# Patient Record
Sex: MALE | Race: OTHER
[De-identification: names, ages, dates, MRNs, and addresses within clinical notes are randomized per-mention and may not be internally consistent; named-entity substitution may affect disease eponyms.]

---

## 2018-04-22 ENCOUNTER — HOSPITAL ENCOUNTER (INPATIENT)
Dept: HOSPITAL 80 - FED | Age: 33
LOS: 3 days | Discharge: HOME | DRG: 880 | End: 2018-04-25
Attending: SPECIALIST | Admitting: SPECIALIST
Payer: COMMERCIAL

## 2018-04-22 DIAGNOSIS — F11.23: ICD-10-CM

## 2018-04-22 DIAGNOSIS — M25.571: ICD-10-CM

## 2018-04-22 DIAGNOSIS — F25.9: ICD-10-CM

## 2018-04-22 DIAGNOSIS — Z91.14: ICD-10-CM

## 2018-04-22 DIAGNOSIS — F17.210: ICD-10-CM

## 2018-04-22 DIAGNOSIS — R45.851: Primary | ICD-10-CM

## 2018-04-22 DIAGNOSIS — M54.5: ICD-10-CM

## 2018-04-22 DIAGNOSIS — Z56.0: ICD-10-CM

## 2018-04-22 DIAGNOSIS — Z91.5: ICD-10-CM

## 2018-04-22 DIAGNOSIS — Z81.8: ICD-10-CM

## 2018-04-22 LAB — PLATELET # BLD: 284 10^3/UL (ref 150–400)

## 2018-04-22 PROCEDURE — G0480 DRUG TEST DEF 1-7 CLASSES: HCPCS

## 2018-04-22 RX ADMIN — MORPHINE SULFATE SCH MG: 15 TABLET, FILM COATED, EXTENDED RELEASE ORAL at 19:12

## 2018-04-22 RX ADMIN — HYDROCODONE BITARTRATE AND ACETAMINOPHEN PRN TAB: 5; 325 TABLET ORAL at 19:17

## 2018-04-22 SDOH — ECONOMIC STABILITY - INCOME SECURITY: UNEMPLOYMENT, UNSPECIFIED: Z56.0
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
## 2018-04-22 NOTE — EDPHY
H & P


Time Seen by Provider: 04/22/18 02:41


HPI/ROS: 





Chief Complaint:  Hearing voices, off his medications





HPI:  32-year-old male with a history of schizoaffective disorder has been off 

his medications for"quite a while".  He presented to the crisis Center this 

morning complaining of hearing voices that are telling him to hurt himself.  

Patient was evaluated and placed on M1 hold.  He was sent here for medical 

clearance.  They are planning on looking for placement for him.  Denies any self

-injury or ingestions.  Denies alcohol or other drug use.  No nausea or 

vomiting.  No chest pain or shortness of breath.  No fevers or chills or recent 

illness.





ROS:  10 point Review of Systems is negative except as noted in the HPI.





PMH:  Schizoaffective disorder





Social History:  Denies smoking, denies alcohol





Family History: non-contributory





Physical Exam:


Gen: Awake, Alert, No Distress


HEENT:  


     Nose: no rhinorrhea


     Eyes: PERRLA, EOMI


     Mouth: Moist mucosa 


Neck: Supple, no JVD


Chest: nontender, lungs clear to auscultation


Heart: S1, S2 normal, no murmur


Abd: Soft, non-tender, no guarding


Back: no CVA tenderness, no midline tenderness 


Ext: no edema, non-tender


Skin: no rash


Neuro: CN II-XII intact, Sensation grossly intact, Strength 5/5 in bilateral 

upper and lower extremities


 (Александр Montano)


Constitutional: 


 Initial Vital Signs











Temperature (C)  36.5 C   04/22/18 02:50


 


Heart Rate  78   04/22/18 02:50


 


Respiratory Rate  18   04/22/18 02:50


 


Blood Pressure  152/92 H  04/22/18 02:50


 


O2 Sat (%)  96   04/22/18 02:50








 











O2 Delivery Mode               Room Air














Allergies/Adverse Reactions: 


 





No Known Allergies Allergy (Unverified 04/22/18 08:25)


 








Home Medications: 














 Medication  Instructions  Recorded


 


ALPRAZolam [Xanax 1 MG (*)] 1 mg PO DAILY PRN 04/22/18


 


HYDROcodone BITARTRATE [Hysingla 80 mg PO DAILY 04/22/18





ER]  


 


Hydrocodone/Acetaminophen [Norco 1 each PO BID PRN 04/22/18





7.5-325 Tablet]  


 


QUEtiapine FUMARATE [Seroquel 100 100 mg PO DAILY 04/22/18





mg (*)]  














Medical Decision Making


ED Course/Re-evaluation: 





Patient is medically cleared.  Pending placement pain





0700  care transferred to Dr. Altamirano pending placement.  No issues during my 

care this patient overnight. (Александр Montano)





I assumed care of this patient at shift change.  We have met all the patient's 

needs throughout my shift.  We are currently looking for placement.





1135: Patient has been accepted to [facility] for inpatient treatment.  (Juwan Altamirano)





- Data Points


Laboratory Results: 


 Laboratory Results





 04/22/18 04:35 





 04/22/18 04:35 





 











  04/22/18 04/22/18 04/22/18





  04:35 04:35 04:35


 


WBC      8.07 10^3/uL 10^3/uL





     (3.80-9.50) 


 


RBC      4.83 10^6/uL 10^6/uL





     (4.40-6.38) 


 


Hgb      14.3 g/dL g/dL





     (13.7-17.5) 


 


Hct      42.5 % %





     (40.0-51.0) 


 


MCV      88.0 fL fL





     (81.5-99.8) 


 


MCH      29.6 pg pg





     (27.9-34.1) 


 


MCHC      33.6 g/dL g/dL





     (32.4-36.7) 


 


RDW      11.9 % %





     (11.5-15.2) 


 


Plt Count      284 10^3/uL 10^3/uL





     (150-400) 


 


MPV      10.1 fL fL





     (8.7-11.7) 


 


Neut % (Auto)      50.6 % %





     (39.3-74.2) 


 


Lymph % (Auto)      37.7 % %





     (15.0-45.0) 


 


Mono % (Auto)      8.7 % %





     (4.5-13.0) 


 


Eos % (Auto)      2.5 % %





     (0.6-7.6) 


 


Baso % (Auto)      0.4 % %





     (0.3-1.7) 


 


Nucleat RBC Rel Count      0.0 % %





     (0.0-0.2) 


 


Absolute Neuts (auto)      4.09 10^3/uL 10^3/uL





     (1.70-6.50) 


 


Absolute Lymphs (auto)      3.04 10^3/uL H 10^3/uL





     (1.00-3.00) 


 


Absolute Monos (auto)      0.70 10^3/uL 10^3/uL





     (0.30-0.80) 


 


Absolute Eos (auto)      0.20 10^3/uL 10^3/uL





     (0.03-0.40) 


 


Absolute Basos (auto)      0.03 10^3/uL 10^3/uL





     (0.02-0.10) 


 


Absolute Nucleated RBC      0.00 10^3/uL 10^3/uL





     (0-0.01) 


 


Immature Gran %      0.1 % %





     (0.0-1.1) 


 


Immature Gran #      0.01 10^3/uL 10^3/uL





     (0.00-0.10) 


 


Sodium    144 mEq/L mEq/L  





    (135-145)  


 


Potassium    4.7 mEq/L mEq/L  





    (3.5-5.2)  


 


Chloride    106 mEq/L mEq/L  





    ()  


 


Carbon Dioxide    29 mEq/l mEq/l  





    (22-31)  


 


Anion Gap    9 mEq/L mEq/L  





    (8-16)  


 


BUN    17 mg/dL mg/dL  





    (7-23)  


 


Creatinine    0.9 mg/dL mg/dL  





    (0.7-1.3)  


 


Estimated GFR    > 60   





    


 


Glucose    116 mg/dL H mg/dL  





    ()  


 


Calcium    9.2 mg/dL mg/dL  





    (8.5-10.4)  


 


Urine Opiates Screen  NEGATIVE     





   (NEGATIVE)   


 


Urine Barbiturates  NEGATIVE     





   (NEGATIVE)   


 


Ur Phencyclidine Scrn  NEGATIVE     





   (NEGATIVE)   


 


Ur Amphetamine Screen  NEGATIVE     





   (NEGATIVE)   


 


U Benzodiazepines Scrn  NEGATIVE     





   (NEGATIVE)   


 


Urine Cocaine Screen  NEGATIVE     





   (NEGATIVE)   


 


U Marijuana (THC) Screen  NEGATIVE     





   (NEGATIVE)   


 


Ethyl Alcohol    < 10 mg/dL mg/dL  





    (0-10)  











Medications Given: 


 








Discontinued Medications





Lorazepam (Ativan)  1 mg PO EDNOW ONE


   Stop: 04/22/18 02:51


   Last Admin: 04/22/18 03:15 Dose:  1 mg


Lorazepam (Ativan)  1 mg PO EDNOW ONE


   Stop: 04/22/18 08:52


   Last Admin: 04/22/18 09:02 Dose:  1 mg


Olanzapine (Zyprexa Zydis)  5 mg PO EDNOW ONE


   Stop: 04/22/18 08:52


   Last Admin: 04/22/18 09:02 Dose:  5 mg








Departure





- Departure


Disposition: [facility] IP


Clinical Impression: 


Schizoaffective disorder


Qualifiers:


 Schizoaffective disorder type: other Qualified Code(s): F25.8 - Other 

schizoaffective disorders





Condition: Fair


Referrals: 


NONE *PRIMARY CARE P,. [Primary Care Provider] - As per Instructions

## 2018-04-23 RX ADMIN — HYDROCODONE BITARTRATE AND ACETAMINOPHEN PRN TAB: 5; 325 TABLET ORAL at 00:47

## 2018-04-23 RX ADMIN — MORPHINE SULFATE SCH MG: 15 TABLET, FILM COATED, EXTENDED RELEASE ORAL at 09:25

## 2018-04-23 RX ADMIN — NICOTINE POLACRILEX PRN MG: 2 GUM, CHEWING BUCCAL at 00:49

## 2018-04-23 RX ADMIN — NICOTINE POLACRILEX PRN MG: 2 GUM, CHEWING BUCCAL at 20:41

## 2018-04-23 RX ADMIN — NICOTINE PRN MG: 14 PATCH TRANSDERMAL at 11:47

## 2018-04-23 RX ADMIN — OLANZAPINE PRN MG: 10 TABLET, ORALLY DISINTEGRATING ORAL at 07:47

## 2018-04-23 RX ADMIN — HYDROCODONE BITARTRATE AND ACETAMINOPHEN PRN TAB: 5; 325 TABLET ORAL at 07:44

## 2018-04-23 RX ADMIN — Medication SCH TAB: at 17:47

## 2018-04-23 RX ADMIN — Medication SCH TAB: at 13:43

## 2018-04-23 RX ADMIN — OLANZAPINE PRN MG: 10 TABLET, ORALLY DISINTEGRATING ORAL at 00:46

## 2018-04-23 RX ADMIN — NICOTINE POLACRILEX PRN MG: 2 GUM, CHEWING BUCCAL at 18:30

## 2018-04-23 NOTE — SOAPPROG
SOAP Progress Note


Assessment/Plan: 


Assessment:


























Plan:





04/23/18 14:45


Multiple problems:





1.  Psychosis.  Will increase Seroquel to 200mg at HS and 50mg PRN.  D/c 

Zyprexa.





2.  Opioid withdrawal.  Will d/c morphine due to lack of benefit and SE's.  

Will start hydrocodone/APAP 20mg TID.  D/c PRN opioids.  Will provide clonidine 

for current w/drawal.  Continue Xanax 1mg PRN.  I have a call in to pt's pain 

mgmt NP re: proper substitution for Hysingla.


Subjective: 





Pt seen, discussed with staff, Dr. Schumacher.  Chart reviewed.  He is a 33 y/o male 

with hx of chronic psychosis, possible depression, chronic pain and SI.  He was 

admitted after d/c'ing his usual Seroquel and Xanax.  He reports increased AH's 

of "a voice telling me to kill myself."  He also describes, "Seeing visions of 

myself doing it [killing himself]."  He has been quite anxious and agitated 

since arriving yesterday.  He states he is "under a lot of stress" due to 

worries about losing his job and his wife being angry at him for being in the 

hospital "when she needs me to help with the kids."  He states, "I just need to 

get my head straight so I go back to normal life.  I can't believe I stopped me 

meds and screwed everything up."  He describes functioning well recently.  He 

has been coparenting with his wife despite their recent separation "for 

fighting too much."  He has also bee working as the graveyard manager of 

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  He state the AH's increased 

dramatically when he d/c'd the meds and this caused him to be unable to work.  

He notes not other stressors at this time.





Pt c/o "bad withdrawal." He reports nausea and stomach cramps, skin crawling 

and severe anxiety.  Has been taking morphine XR here due to his Hysingla not 

being on formulary.  He requests change to hydrocodone.  States he has taken 

the Hysingla 80mg per day for more than six months.


Objective: 





 Vital Signs











Temp Pulse Resp BP Pulse Ox


 


 36.0 C   85   18   152/91 H  97 


 


 04/22/18 11:48  04/22/18 11:48  04/22/18 11:48  04/22/18 11:48  04/22/18 11:48








MSE:  Adequately groomed, pleasant and coop.  Moderate level of psychomotor 

agitation is noted.  Affect is constricted, anxious.  Mood is "bad."  TP is 

generally linear.  TC reveals AH's inc: CAH's of a voice "telling me to kill 

myself."  Denies intent to harm himself, though is "afraid what I would do if I 

wasn't here."  





- Time Spent With Patient


Time Spent With Patient: 





25"





ICD10 Worksheet


Patient Problems: 


 Problems











Problem Status Onset


 


Schizoaffective disorder Acute

## 2018-04-24 RX ADMIN — NICOTINE POLACRILEX PRN MG: 2 GUM, CHEWING BUCCAL at 17:47

## 2018-04-24 RX ADMIN — Medication SCH TAB: at 17:35

## 2018-04-24 RX ADMIN — Medication SCH TAB: at 08:52

## 2018-04-24 RX ADMIN — NICOTINE PRN MG: 14 PATCH TRANSDERMAL at 10:10

## 2018-04-24 RX ADMIN — NICOTINE POLACRILEX PRN MG: 2 GUM, CHEWING BUCCAL at 10:40

## 2018-04-24 RX ADMIN — Medication SCH TAB: at 13:27

## 2018-04-24 NOTE — SOAPPROG
SOAP Progress Note


Assessment/Plan: 


Assessment:


























Plan:





04/23/18 14:45


Multiple problems:





1.  Psychosis.  Will increase Seroquel to 200mg at HS and 50mg PRN.  D/c 

Zyprexa.





2.  Opioid withdrawal.  Will d/c morphine due to lack of benefit and SE's.  

Will start hydrocodone/APAP 20mg TID.  D/c PRN opioids.  Will provide clonidine 

for current w/drawal.  Continue Xanax 1mg PRN.  I have a call in to pt's pain 

mgmt NP re: proper substitution for Hysingla.


04/24/18 14:19


Psychosis:  Improved.  Visions and voices less prominent, less intrusive.  CCM.





Pain:  W/drawal has resolved.  Outpt f/u in place.





D/c:  Doing well.  Possible d/c tomorrow if all is well.


Subjective: 





Pt seen, discussed with staff.  States he slept >10 hours last night.  Feels 

"way calmer" with med changes. Tolerating increased dose of SQL well.  W/drawal 

sx's have resolved.  I discussed case with pt's outpt pain mgmt NP who agrees 

with plan and will provide refill of pain meds on d/c.  He stated he will send 

the prescription to the pharmacy today to make sure it gets there.


Objective: 





 Vital Signs











Temp Pulse Resp BP Pulse Ox


 


 36.4 C   67   14   111/61   96 


 


 04/24/18 06:00  04/24/18 06:00  04/24/18 06:00  04/24/18 06:00  04/24/18 06:00














- Time Spent With Patient


Time Spent With Patient: 





25"





ICD10 Worksheet


Patient Problems: 


 Problems











Problem Status Onset


 


Schizoaffective disorder Acute

## 2018-04-25 VITALS — DIASTOLIC BLOOD PRESSURE: 71 MMHG | SYSTOLIC BLOOD PRESSURE: 114 MMHG

## 2018-04-25 RX ADMIN — NICOTINE PRN MG: 14 PATCH TRANSDERMAL at 07:40

## 2018-04-25 RX ADMIN — Medication SCH TAB: at 13:10

## 2018-04-25 RX ADMIN — Medication SCH TAB: at 08:54
